# Patient Record
Sex: FEMALE | Race: WHITE
[De-identification: names, ages, dates, MRNs, and addresses within clinical notes are randomized per-mention and may not be internally consistent; named-entity substitution may affect disease eponyms.]

---

## 2018-12-27 NOTE — XMS REPORT
Clinical Summary

                             Created on: 2018



Ngozi Ortez

External Reference #: [id number]

: 1965

Sex: Female



Demographics







                          Address                   [address] [id number]

 

                          Home Phone                [phone]

 

                          Preferred Language        English

 

                          Marital Status            Single

 

                          Roman Catholic Affiliation     [id number]

 

                          Race                      White

 

                          Ethnic Group              Non-





Author







                          Author                    [facility]

 

                          Organization              [facility]

 

                          Address                   Unknown

 

                          Phone                     Unavailable







Care Team Providers







                    Care Team Member Name    Role                Phone

 

                          PCP                       Unavailable







Allergies

Not on File



Medications

Not on file



Active Problems





Not on file



Encounters







                          Care Team                 Description



                     Date                Type                Specialty  

 

                                                     



                     2018          Clinical            [facility]  



                                         Support   



after 2017



Immunizations







  



                     Name                Dates Previously Given     Next Due

 

  



                           FLUCELVAX QUAD PF (0.5mL     2018 



                                         syringe)  







Social History







                                        Date



                 Tobacco Use     Types           Packs/Day       Years Used 

 

                                         



                                         Never Assessed    









 



                           Sex Assigned at Birth     Date Recorded

 

 



                                         Not on file 









                                        Industry



                           Job Start Date            Occupation 

 

                                        Not on file



                           Not on file               Not on file 









                                        Travel End



                           Travel History            Travel Start 

 





                                         No recent travel history available.







Last Filed Vital Signs

Not on file



Plan of Treatment







   



                 Health Maintenance     Due Date        Last Done       Comments

 

   



                           CERVICAL CANCER SCREENING     10/17/1986  

 

   



                           BREAST CANCER SCREENING     10/17/2015  

 

   



                           COLON CANCER SCREENING     10/17/2015  

 

   



                           SHINGLES VACCINES (1 of     10/17/2015  



                                         2)   

 

   



                     INFLUENZA VACCINE     Completed           2018 







Results

Not on fileafter 2017



Insurance







     



            Payer      Benefit     Subscriber ID     Type       Phone      Address



                                         Plan /    



                                         Group    

 

     



                 [employer]           [employer]      xxxxxxxxxxx     HMO  



                                         ACCESS/NET    



                                         WORK    









     



            Guarantor Name     Account     Relation to     Date of     Phone      Billing Address



                     Type                Patient             Birth  

 

     



            Ngozi Ortez     Personal/F     Self       1965     [phone]     [address] [facility] [address]



                     [address]               (Home)              [place] [address]

## 2018-12-27 NOTE — DIAGNOSTIC IMAGING REPORT
PROCEDURE:KNEE THREE VIEWS BILATERAL

 

COMPARISON:None.

 

INDICATIONS:FELL AND LANDED ON BOTH KNEES 

 

FINDINGS:

There are no fractures, dislocations, lytic or blastic lesions. Minor 

degenerative spurring. The bones are well-mineralized. No effusions. 

The soft-tissues are unremarkable.

 

CONCLUSION:

No acute bony abnormality. 

 

Scooby Lomeli D.O.  

Dictated by:  Scooby Lomeli D.O. on 12/27/2018 at 15:20     

Electronically approved by:  Scooby Lomeli D.O. on 12/27/2018 at 15:20

## 2019-01-22 ENCOUNTER — HOSPITAL ENCOUNTER (EMERGENCY)
Dept: HOSPITAL 88 - ER | Age: 54
Discharge: HOME | End: 2019-01-22
Payer: COMMERCIAL

## 2019-01-22 VITALS — HEIGHT: 62 IN | WEIGHT: 189 LBS | BODY MASS INDEX: 34.78 KG/M2

## 2019-01-22 DIAGNOSIS — I10: ICD-10-CM

## 2019-01-22 DIAGNOSIS — R10.32: ICD-10-CM

## 2019-01-22 DIAGNOSIS — R10.31: Primary | ICD-10-CM

## 2019-01-22 DIAGNOSIS — A08.4: ICD-10-CM

## 2019-01-22 DIAGNOSIS — R11.2: ICD-10-CM

## 2019-01-22 DIAGNOSIS — Z87.19: ICD-10-CM

## 2019-01-22 LAB
ALBUMIN SERPL-MCNC: 4.3 G/DL (ref 3.5–5)
ALBUMIN/GLOB SERPL: 1.3 {RATIO} (ref 0.8–2)
ALP SERPL-CCNC: 58 IU/L (ref 40–150)
ALT SERPL-CCNC: 19 IU/L (ref 0–55)
ANION GAP SERPL CALC-SCNC: 14.1 MMOL/L (ref 8–16)
BACTERIA URNS QL MICRO: (no result) /HPF
BASOPHILS # BLD AUTO: 0.1 10*3/UL (ref 0–0.1)
BASOPHILS NFR BLD AUTO: 0.8 % (ref 0–1)
BILIRUB UR QL: NEGATIVE
BUN SERPL-MCNC: 18 MG/DL (ref 7–26)
BUN/CREAT SERPL: 14 (ref 6–25)
CALCIUM SERPL-MCNC: 9.9 MG/DL (ref 8.4–10.2)
CHLORIDE SERPL-SCNC: 100 MMOL/L (ref 98–107)
CLARITY UR: CLEAR
CO2 SERPL-SCNC: 26 MMOL/L (ref 22–29)
COLOR UR: YELLOW
DEPRECATED NEUTROPHILS # BLD AUTO: 4 10*3/UL (ref 2.1–6.9)
DEPRECATED RBC URNS MANUAL-ACNC: (no result) /HPF (ref 0–5)
EGFRCR SERPLBLD CKD-EPI 2021: 42 ML/MIN (ref 60–?)
EOSINOPHIL # BLD AUTO: 0.1 10*3/UL (ref 0–0.4)
EOSINOPHIL NFR BLD AUTO: 1.8 % (ref 0–6)
EPI CELLS URNS QL MICRO: (no result) /LPF
ERYTHROCYTE [DISTWIDTH] IN CORD BLOOD: 13.2 % (ref 11.7–14.4)
GLOBULIN PLAS-MCNC: 3.4 G/DL (ref 2.3–3.5)
GLUCOSE SERPLBLD-MCNC: 93 MG/DL (ref 74–118)
HCT VFR BLD AUTO: 38.3 % (ref 34.2–44.1)
HGB BLD-MCNC: 12.7 G/DL (ref 12–16)
KETONES UR QL STRIP.AUTO: NEGATIVE
LEUKOCYTE ESTERASE UR QL STRIP.AUTO: (no result)
LYMPHOCYTES # BLD: 2.6 10*3/UL (ref 1–3.2)
LYMPHOCYTES NFR BLD AUTO: 35.8 % (ref 18–39.1)
MCH RBC QN AUTO: 29.5 PG (ref 28–32)
MCHC RBC AUTO-ENTMCNC: 33.2 G/DL (ref 31–35)
MCV RBC AUTO: 88.9 FL (ref 81–99)
MONOCYTES # BLD AUTO: 0.5 10*3/UL (ref 0.2–0.8)
MONOCYTES NFR BLD AUTO: 7.2 % (ref 4.4–11.3)
NEUTS SEG NFR BLD AUTO: 54.3 % (ref 38.7–80)
NITRITE UR QL STRIP.AUTO: NEGATIVE
PLATELET # BLD AUTO: 411 X10E3/UL (ref 140–360)
POTASSIUM SERPL-SCNC: 4.1 MMOL/L (ref 3.5–5.1)
PROT UR QL STRIP.AUTO: NEGATIVE
RBC # BLD AUTO: 4.31 X10E6/UL (ref 3.6–5.1)
SODIUM SERPL-SCNC: 136 MMOL/L (ref 136–145)
SP GR UR STRIP: 1 (ref 1.01–1.02)
UROBILINOGEN UR STRIP-MCNC: 0.2 MG/DL (ref 0.2–1)
WBC #/AREA URNS HPF: (no result) /HPF (ref 0–5)

## 2019-01-22 PROCEDURE — 85025 COMPLETE CBC W/AUTO DIFF WBC: CPT

## 2019-01-22 PROCEDURE — 36415 COLL VENOUS BLD VENIPUNCTURE: CPT

## 2019-01-22 PROCEDURE — 74177 CT ABD & PELVIS W/CONTRAST: CPT

## 2019-01-22 PROCEDURE — 84702 CHORIONIC GONADOTROPIN TEST: CPT

## 2019-01-22 PROCEDURE — 81001 URINALYSIS AUTO W/SCOPE: CPT

## 2019-01-22 PROCEDURE — 99284 EMERGENCY DEPT VISIT MOD MDM: CPT

## 2019-01-22 PROCEDURE — 80053 COMPREHEN METABOLIC PANEL: CPT

## 2019-01-22 NOTE — DIAGNOSTIC IMAGING REPORT
EXAM:  CT of the abdomen and pelvis WITH contrast



HISTORY:  RLQ ABD/APPY PROTOCOL

COMPARISON:  None.



TECHNIQUE: 

The abdomen and pelvis were scanned utilizing a multidetector helical scanner. 

Coronal and sagittal reformats are provided.

            PROTOCOL:  Routine

            IV CONTRAST:  100 cc of Isovue-370.

            ORAL CONTRAST:  None, which limits sensitivity and specificity of

the exam.

            RADIATION DOSE: Total DLP: 580.52 mGy*cm

                      Estimated effective dose:  (DLP x 0.015 x size factor)

Dose modulation, iterative reconstruction, and/or weight based adjustment of

the mA/kV was utilized to reduce the radiation dose to as low as reasonably

achievable.

            COMPLICATIONS: None



FINDINGS:

LOWER THORAX: Unremarkable.



HEPATOBILIARY:  

Diffusely decreased attenuation of the liver relative to the spleen.

No focal hepatic lesions.  No biliary ductal dilatation.  

The gallbladder is unremarkable.



SPLEEN: No splenomegaly.    

PANCREAS: No focal masses or ductal dilatation.    

ADRENALS:  No discrete adrenal nodule.



KIDNEYS/URETERS: 

No hydronephrosis, stones, or solid mass lesions.



PELVIC ORGANS/BLADDER: The visualized pelvic organs appear unremarkable.  The

uterus is anteflexed.



PERITONEUM / RETROPERITONEUM: No free air or fluid.

LYMPH NODES: No pathologically enlarged lymph node.

VESSELS: Unremarkable.

GI TRACT: Numerous sigmoid diverticuli.  The appendix is normal.

BONES:  No aggressive osseous lesion or acute fracture. 

SOFT TISSUES: Unremarkable.





IMPRESSION: 

1.  No acute CT abnormality.

2.  Sigmoid diverticulosis.

3.  Hepatic steatosis.



Signed by: Dr. Juanjose Hough D.O., M.M.M. on 1/22/2019 7:57 PM

## 2019-01-22 NOTE — XMS REPORT
Patient Summary Document

                             Created on: 2019



MARIA GUADALUPE PERRY

External Reference #: [id number]

: 1965

Sex: Female



Demographics







                          Address                   [address] [id number]

 

                          Home Phone                [phone]

 

                          Preferred Language        Unknown

 

                          Marital Status            Unknown

 

                          Christianity Affiliation     Unknown

 

                          Race                      Unknown

 

                                        Additional Race(s)  

 

                          Ethnic Group              Unknown





Author







                          Author                    [facility]

 

                          Address                   Unknown

 

                          Phone                     Unavailable







Care Team Providers







                    Care Team Member Name    Role                Phone

 

                    SWEET, A LAIRD      Unavailable         Unavailable







Problems

This patient has no known problems.



Allergies, Adverse Reactions, Alerts

This patient has no known allergies or adverse reactions.



Medications

This patient has no known medications.



Results







           Test Description    Test Time    Test Comments    Text Results    Atomic Results    Result

 Comments

 

                KNEE THREE VIEWS BILATERAL    2018 15:20:00                                                

                                                          [facility]                        [address] [address]      Patient Name: MARIA GUADALUPE PERRY                     
             MR #: [id number]                     : 1965               
                   Age/Sex: 53/F  Acct #: [id number]                          
   Req #: [id number]  Adm Physician:                                            
         Ordered by: NILE DELVALLE NP                            Report #: 
[id number]        Location: ER                                      Room/Bed:    
                
___________________________________________________________________________________________________
   Procedure: [id number] DX/KNEE THREE VIEWS BILATERAL  Exam Date: 18      
                     Exam Time: 1212                                            
 REPORT STATUS: Signed    PROCEDURE:   KNEE THREE VIEWS BILATERAL       
COMPARISON:   None.       INDICATIONS:   FELL AND LANDED ON BOTH KNEES        
FINDINGS:      There are no fractures, dislocations, lytic or blastic lesions. 
Minor    degenerative spurring. The bones are well-mineralized. No effusions.   
The soft-tissues are unremarkable.          CONCLUSION:      No acute bony 
abnormality.        Layla Lomeli D.O.     Dictated by:  Layla Lomeli D.O. on
2018 at 15:20        Electronically approved by:  Layla Lomeli D.O. on 
2018 at 15:20                   Dictated By: LAYLA LOMELI DO  
Electronically Signed By: LAYLA LOMELI DO on 18 1520  Transcribed By: 
CHITRA on 18 1520       COPY TO:   NILE DELVALLE NP

## 2019-01-22 NOTE — XMS REPORT
Clinical Summary

                             Created on: 2019



Ngozi Ortez

External Reference #: [id number]

: 1965

Sex: Female



Demographics







                          Address                   [address] [id number]

 

                          Home Phone                [phone]

 

                          Preferred Language        English

 

                          Marital Status            Single

 

                          [place] Affiliation     1013

 

                          Race                      White

 

                          Ethnic Group              Non-





Author







                          Author                    [facility]

 

                          Organization              [facility]

 

                          Address                   Unknown

 

                          Phone                     Unavailable







Care Team Providers







                    Care Team Member Name    Role                Phone

 

                          PCP                       Unavailable







Allergies

Not on File



Medications

Not on file



Active Problems





Not on file



Encounters







                          Care Team                 Description



                     Date                Type                Specialty  

 

                                                     



                     2018          Clinical            [facility]  



                                         Support   



after 2018



Immunizations







  



                     Name                Dates Previously Given     Next Due

 

  



                           FLUCELVAX QUAD PF (0.5mL     2018 



                                         syringe)  







Social History







                                        Date



                 Tobacco Use     Types           Packs/Day       Years Used 

 

                                         



                                         Never Assessed    









 



                           Sex Assigned at Birth     Date Recorded

 

 



                                         Not on file 









                                        Industry



                           Job Start Date            Occupation 

 

                                        Not on file



                           Not on file               Not on file 









                                        Travel End



                           Travel History            Travel Start 

 





                                         No recent travel history available.







Last Filed Vital Signs

Not on file



Plan of Treatment







   



                 Health Maintenance     Due Date        Last Done       Comments

 

   



                           CERVICAL CANCER SCREENING     10/17/1986  

 

   



                           BREAST CANCER SCREENING     10/17/2015  

 

   



                           COLON CANCER SCREENING     10/17/2015  

 

   



                           SHINGLES VACCINES (1 of     10/17/2015  



                                         2)   

 

   



                     INFLUENZA VACCINE     Completed           2018 







Results

Not on fileafter 2018



Insurance







     



            Payer      Benefit     Subscriber ID     Type       Phone      Address



                                         Plan /    



                                         Group    

 

     



                 [employer]           [employer]      xxxxxxxxxxx     HMO  



                                         ACCESS/NET    



                                         WORK    









     



            Guarantor Name     Account     Relation to     Date of     Phone      Billing Address



                     Type                Patient             Birth  

 

     



            Ngozi Ortez     Personal/F     Self       1965     [phone]     [address]



                     [address]               (Home)              [place] [address]

## 2019-04-08 ENCOUNTER — HOSPITAL ENCOUNTER (OUTPATIENT)
Dept: HOSPITAL 88 - MAMMO | Age: 54
End: 2019-04-08
Payer: COMMERCIAL

## 2019-04-08 DIAGNOSIS — Z12.31: Primary | ICD-10-CM

## 2019-04-08 PROCEDURE — 77067 SCR MAMMO BI INCL CAD: CPT
